# Patient Record
Sex: FEMALE
[De-identification: names, ages, dates, MRNs, and addresses within clinical notes are randomized per-mention and may not be internally consistent; named-entity substitution may affect disease eponyms.]

---

## 2018-07-04 ENCOUNTER — HOSPITAL ENCOUNTER (EMERGENCY)
Dept: HOSPITAL 97 - ER | Age: 28
Discharge: HOME | End: 2018-07-04
Payer: SELF-PAY

## 2018-07-04 DIAGNOSIS — I10: ICD-10-CM

## 2018-07-04 DIAGNOSIS — M25.511: Primary | ICD-10-CM

## 2018-07-04 PROCEDURE — 99283 EMERGENCY DEPT VISIT LOW MDM: CPT

## 2018-07-04 PROCEDURE — 81003 URINALYSIS AUTO W/O SCOPE: CPT

## 2018-07-04 NOTE — RAD REPORT
EXAM DESCRIPTION:  Shoulder  Right 2 View - 7/4/2018 1:56 am

 

CLINICAL HISTORY:  Fall, shoulder pain

 

COMPARISON:  None.

 

TECHNIQUE:  Internal and external rotation views of the right shoulder were obtained.

 

FINDINGS:  There is no fracture or dislocation. AC joint is normal in appearance. Acromial humeral marcy
int space is normal range with no abnormal calcifications. Ribs and parenchyma of the upper chest nor
mal range as well. No acute or suspicious findings.

 

IMPRESSION:  Negative two-view right shoulder examination.

## 2018-07-04 NOTE — ER
Nurse's Notes                                                                                     

 [facility]                                                                

Name: Sabine Lujan                                                                                

Age: 28 yrs                                                                                       

Sex: Female                                                                                       

: 1990                                                                                   

MRN: [id number]                                                                                   

Arrival Date: 2018                                                                          

Time: 00:50                                                                                       

Account#: [id number]                                                                            

Bed 24                                                                                            

Private MD:                                                                                       

Diagnosis: Pain in right shoulder-s/p Fall                                                        

                                                                                                  

Presentation:                                                                                     

                                                                                             

01:08 Presenting complaint: Patient states: hurt her right shoulder about an half hour ago.   ao  

      Transition of care: patient was not received from another setting of care. Onset of         

      symptoms was 2018 at 00:30. Risk Assessment: Do you want to hurt yourself or       

      someone else? Patient reports no desire to harm self or others. Initial Sepsis Screen:      

      Does the patient meet any 2 criteria? No. Patient's initial sepsis screen is negative.      

      Does the patient have a suspected source of infection? No. Patient's initial sepsis         

      screen is negative. Care prior to arrival: None.                                            

01:08 Method Of Arrival: Ambulatory                                                           ao  

01:08 Acuity: STEPHAN 4                                                                           ao  

                                                                                                  

Triage Assessment:                                                                                

01:16 Injury Description: Shoulder injury.                                                    ao  

                                                                                                  

OB/GYN:                                                                                           

01:11 LMP 2018                                                                           ao  

                                                                                                  

Historical:                                                                                       

- Allergies:                                                                                      

01:10 No Known Allergies;                                                                     ao  

- Home Meds:                                                                                      

01:10 None [Active];                                                                          ao  

- PMHx:                                                                                           

01:10 Hypertension;                                                                           ao  

- PSHx:                                                                                           

01:10 Tonsillectomy;                                                                          ao  

                                                                                                  

- Immunization history:: Adult Immunizations unknown.                                             

- Social history:: Smoking status: Patient uses tobacco products, denies chronic                  

  smoking, but will smoke occasionally, Patient uses alcohol, occasionally.                       

  Patient/guardian denies using street drugs.                                                     

- Ebola Screening: : Patient negative for fever greater than or equal to 101.5 degrees            

  Fahrenheit, and additional compatible Ebola Virus Disease symptoms Patient denies               

  exposure to infectious person Patient denies travel to an Ebola-affected area in the            

  21 days before illness onset.                                                                   

                                                                                                  

                                                                                                  

Screenin:15 Abuse screen: Denies threats or abuse. Denies injuries from another. Nutritional        ao  

      screening: No deficits noted. Tuberculosis screening: No symptoms or risk factors           

      identified. Fall Risk None identified.                                                      

                                                                                                  

Assessment:                                                                                       

01:12 General: Appears in no apparent distress. comfortable, Behavior is calm, cooperative,   ao  

      appropriate for age. Pain: Complains of pain in right shoulder. Neuro: Level of             

      Consciousness is awake, alert, obeys commands, Oriented to person, place, time,             

      situation, Appropriate for age Moves all extremities. Full function Speech is normal,       

      Cardiovascular: Capillary refill < 3 seconds Patient's skin is warm and dry.                

      Respiratory: Airway is patent Respiratory effort is even, unlabored, Respiratory            

      pattern is regular, symmetrical. GI: Abdomen is non-distended. : No signs and/or          

      symptoms were reported regarding the genitourinary system. EENT: No signs and/or            

      symptoms were reported regarding the EENT system. Derm: Skin is pink, warm \T\ dry.         

      normal, Skin temperature is warm. Musculoskeletal: Range of motion: limited in right        

      shoulder Reports pain in right shoulder since 0030. Pain is 8 out of 10 on a pain scale.    

                                                                                                  

Vital Signs:                                                                                      

01:11  / 87; Pulse 89; Resp 20; Temp 98.9(O); Pulse Ox 100% ; Weight 62.6 kg (R);       ao  

      Height 4 ft. 11 in. (149.86 cm) (R); Pain 8/10;                                             

01:11 Body Mass Index 27.87 (62.60 kg, 149.86 cm)                                             ao  

                                                                                                  

ED Course:                                                                                        

00:50 Patient arrived in ED.                                                                  ds1 

01:00 Michael Shelley PA is PHCP.                                                                cp  

01:00 Jarret Nixon MD is Attending Physician.                                            cp  

01:08 Mark Schneider, RN is Primary Nurse.                                                       ao  

01:10 Triage completed.                                                                       ao  

01:12 Arm band placed on right wrist. Patient placed in an exam room, Patient notified of     ao  

      wait time.                                                                                  

01:15 Patient has correct armband on for positive identification. Fall risk band placed. Bed  ao  

      in low position. Call light in reach. Pulse ox on. NIBP on.                                 

01:52 X-ray completed. Portable x-ray completed in exam room. Patient tolerated procedure     bb2 

      well.                                                                                       

01:54 XRAY Shoulder RIGHT 2 view In Process Unspecified.                                      EDMS

02:22 Faizan Guillermo MD is Referral Physician.                                               cp  

02:41 No provider procedures requiring assistance completed. Patient did not have IV access   ao  

      during this emergency room visit.                                                           

                                                                                                  

Administered Medications:                                                                         

No medications were administered                                                                  

                                                                                                  

                                                                                                  

Outcome:                                                                                          

02:22 Discharge ordered by MD.                                                                cp  

02:42 Discharged to home ambulatory.                                                          ao  

02:42 Condition: stable                                                                           

02:42 Discharge instructions given to patient, Instructed on discharge instructions, follow       

      up and referral plans. Demonstrated understanding of instructions, follow-up care,          

      medications, Prescriptions given X 3.                                                       

02:42 Patient left the ED.                                                                    ao  

                                                                                                  

Signatures:                                                                                       

Dispatcher [employer]                           EDMS                                                 

Stacey Browning                                ds1                                                  

Michael Shelley PA PA cp Ortiz, Alex, RN                         RN   Mey Bhagat                               bb2                                                  

                                                                                                  

**************************************************************************************************

## 2018-07-04 NOTE — EDPHYS
Physician Documentation                                                                           

 [facility]                                                                

Name: Sabine Lujan                                                                                

Age: 28 yrs                                                                                       

Sex: Female                                                                                       

: 1990                                                                                   

MRN: [id number]                                                                                   

Arrival Date: 2018                                                                          

Time: 00:50                                                                                       

Account#: [id number]                                                                            

Bed 24                                                                                            

Private MD:                                                                                       

ED Physician Jarret Nixon                                                                     

HPI:                                                                                              

                                                                                             

01:17 This 28 yrs old  Female presents to ER via Ambulatory with complaints of        cp  

      Shoulder Injury.                                                                            

01:17 The patient or guardian complains of an injury, pain, that is acute, tenderness. right  cp  

      shoulder. Context: The problem was sustained outdoors, resulted from fall while on back     

      of male friend. Onset: The symptoms/episode began/occurred just prior to arrival.           

                                                                                                  

OB/GYN:                                                                                           

01:11 LMP 2018                                                                           ao  

                                                                                                  

Historical:                                                                                       

- Allergies:                                                                                      

01:10 No Known Allergies;                                                                     ao  

- Home Meds:                                                                                      

01:10 None [Active];                                                                          ao  

- PMHx:                                                                                           

01:10 Hypertension;                                                                           ao  

- PSHx:                                                                                           

01:10 Tonsillectomy;                                                                          ao  

                                                                                                  

- Immunization history:: Adult Immunizations unknown.                                             

- Social history:: Smoking status: Patient uses tobacco products, denies chronic                  

  smoking, but will smoke occasionally, Patient uses alcohol, occasionally.                       

  Patient/guardian denies using street drugs.                                                     

- Ebola Screening: : Patient negative for fever greater than or equal to 101.5 degrees            

  Fahrenheit, and additional compatible Ebola Virus Disease symptoms Patient denies               

  exposure to infectious person Patient denies travel to an Ebola-affected area in the            

  21 days before illness onset.                                                                   

                                                                                                  

                                                                                                  

ROS:                                                                                              

01:20 Constitutional: Negative for body aches, chills, fever, poor PO intake.                 cp  

01:20 Eyes: Negative for injury, pain, redness, and discharge.                                cp  

01:20 ENT: Negative for drainage from ear(s), ear pain, sore throat, difficulty swallowing,       

      difficulty handling secretions.                                                             

01:20 Neck: Negative for pain with movement, pain at rest, stiffness, bony tenderness.            

01:20 Cardiovascular: Negative for chest pain, edema, palpitations.                               

01:20 Respiratory: Negative for cough, shortness of breath, wheezing.                             

01:20 Abdomen/GI: Negative for abdominal pain, vomiting, diarrhea, constipation.                  

01:20 Back: Negative for injury or acute deformity, pain at rest, pain with movement.             

01:20 MS/extremity: Positive for pain, swelling, tenderness, of the top of right shoulder,        

      painful ROM, Negative for decreased range of motion, paresthesias.                          

01:20 Skin: Negative for cellulitis, rash.                                                        

01:20 Neuro: Negative for altered mental status, headache, loss of consciousness, syncope,        

      near syncope.                                                                               

01:20 All other systems are negative.                                                             

                                                                                                  

Exam:                                                                                             

01:27 Constitutional: The patient appears in no acute distress, alert, awake, non-toxic, well cp  

      developed, well nourished, uncomfortable.                                                   

01:27 Head/Face:  Normocephalic, atraumatic.                                                  cp  

01:27 Eyes: Periorbital structures: appear normal, Pupils: equal, round, and reactive to          

      light and accomodation, Extraocular movements: intact throughout, Conjunctiva: normal,      

      no exudate, no injection, Lids and lashes: appear normal, bilaterally.                      

01:27 ENT: External ear(s): are unremarkable, Nose: is normal, Mouth: Lips: moist, Oral           

      mucosa: pink and intact, moist, Posterior pharynx: is normal, airway is patent.             

01:27 Neck: C-spine: vertebral tenderness, is not appreciated, crepitus, is not appreciated,      

      ROM/movement: is normal, is supple, without pain, no range of motions limitations, no       

      nuchal rigidity.                                                                            

01:27 Chest/axilla: Inspection: normal, Palpation: tenderness, that is moderate, of the           

      right distal clavicle, that partially reproduces the patient's complaints.                  

01:27 Cardiovascular: Rate: normal, Rhythm: regular, Pulses: Pulses are 2+ in right radial        

      artery. JVD: is not appreciated.                                                            

01:27 Respiratory: the patient does not display signs of respiratory distress,  Respirations:     

      normal, no use of accessory muscles, no retractions, no splinting, no tachypnea,            

      labored breathing, is not present, Breath sounds: are clear throughout, no decreased        

      breath sounds, no stridor, no wheezing.                                                     

01:27 Abdomen/GI: Exam negative for discomfort, distension, guarding, Inspection: abdomen         

      appears normal.                                                                             

01:27 Back: pain, is absent, ROM is normal.                                                       

01:27 Musculoskeletal/extremity: Extremities: grossly normal except: noted in the right AC        

      joint: pain, swelling, tenderness, There is no evidence of  decreased ROM, deformity,       

      right shoulder, Sensation intact.                                                           

01:27 Skin: cellulitis, is not appreciated, no rash present.                                      

01:27 Neuro: Orientation: to person, place \T\ time. Mentation: lucid, able to follow commands.   

                                                                                                  

Vital Signs:                                                                                      

01:11  / 87; Pulse 89; Resp 20; Temp 98.9(O); Pulse Ox 100% ; Weight 62.6 kg (R);       ao  

      Height 4 ft. 11 in. (149.86 cm) (R); Pain 8/10;                                             

01:11 Body Mass Index 27.87 (62.60 kg, 149.86 cm)                                             ao  

                                                                                                  

MDM:                                                                                              

01:00 Patient medically screened.                                                             cp  

02:20 Data reviewed: vital signs, nurses notes, radiologic studies, plain films, and as a     cp  

      result, I will discharge patient.                                                           

02:20 Differential diagnosis: Anterior dislocation with fracture, Anterior dislocation        cp  

      without fracture, Posterior dislocation with fracture, Posterior dislocation without        

      fracture, AC joint separation, clavicle fracture. Test interpretation: by ED physician      

      or midlevel provider: plain radiologic studies.                                             

                                                                                                  

                                                                                             

02:24 Order name: Urine Dipstick--Ancillary (enter results)                                   rg2 

                                                                                             

01:15 Order name: XRAY Shoulder RIGHT 2 view                                                  cp  

                                                                                             

01:15 Order name: Urine Pregnancy Test (obtain specimen); Complete Time: 02:20                cp  

                                                                                             

01:15 Order name: Urine Dipstick-Ancillary (obtain specimen); Complete Time: 02:20            cp  

                                                                                             

02:20 Order name: Sling; Complete Time: 02:24                                                 cp  

                                                                                                  

Administered Medications:                                                                         

No medications were administered                                                                  

                                                                                                  

                                                                                                  

Disposition:                                                                                      

04:14 Co-signature as Attending Physician, Jarret Nixon MD I agree with the assessment and 4 

      plan of care.                                                                               

                                                                                                  

Disposition:                                                                                      

18 02:22 Discharged to Home. Impression: Pain in right shoulder - s/p Fall.                 

- Condition is Stable.                                                                            

- Discharge Instructions: Acromioclavicular Injuries, Shoulder Pain.                              

- Prescriptions for Cyclobenzaprine 10 mg Oral Tablet - take 1 tablet by ORAL route               

  every 8 hours As needed no driving while taking medication; 20 tablet. Tramadol 50 mg           

  Oral Tablet - take 1 tablet by ORAL route every 8 hours as needed; 15 tablet.                   

  Ibuprofen 800 mg Oral Tablet - take 1 tablet by ORAL route every 8 hours As needed              

  take with food; 30 tablet.                                                                      

- Medication Reconciliation Form, Thank You Letter, Antibiotic Education, Prescription            

  Opioid Use form.                                                                                

- Follow up: Faizan Guillermo MD; When: 2 - 3 days; Reason: Recheck today's complaints.            

- Problem is new.                                                                                 

- Symptoms have improved.                                                                         

                                                                                                  

                                                                                                  

                                                                                                  

Signatures:                                                                                       

Dispatcher [employer]                           EDMS                                                 

Michael Shelley PA PA cp Ortiz, Alex RN                         RN   ao                                                   

Jarret Nixon MD MD   tw4                                                  

                                                                                                  

Corrections: (The following items were deleted from the chart)                                    

02:42 02:22 2018 02:22 Discharged to Home. Impression: Pain in right shoulder - s/p     ao  

      Fall. Condition is Stable. Forms are Medication Reconciliation Form, Thank You Letter,      

      Antibiotic Education, Prescription Opioid Use. Follow up: Faizan Guillermo; When: 2 - 3        

      days; Reason: Recheck today's complaints. Problem is new. Symptoms have improved. cp        

                                                                                                  

**************************************************************************************************

## 2018-10-31 ENCOUNTER — HOSPITAL ENCOUNTER (EMERGENCY)
Dept: HOSPITAL 97 - ER | Age: 28
Discharge: LEFT BEFORE DISCHARGE | End: 2018-10-31
Payer: SELF-PAY

## 2018-10-31 DIAGNOSIS — Z02.9: Primary | ICD-10-CM

## 2018-10-31 NOTE — ER
Nurse's Notes                                                                                     

 [facility]                                                                

Name: Sabine Lujan                                                                                

Age: 28 yrs                                                                                       

Sex: Female                                                                                       

: 1990                                                                                   

MRN: [id number]                                                                                   

Arrival Date: 10/31/2018                                                                          

Time: 21:40                                                                                       

Account#: [id number]                                                                            

Bed Waiting                                                                                       

Private MD:                                                                                       

Diagnosis:                                                                                        

                                                                                                  

Assessment:                                                                                       

10/31                                                                                             

22:31 Reassessment: Patient reported to registration that she will come back in the morning,  kr2 

      she is not pregnant but is just having irregular bleeding.                                  

                                                                                                  

ED Course:                                                                                        

21:40 Patient arrived in ED.                                                                  am2 

                                                                                                  

Administered Medications:                                                                         

No medications were administered                                                                  

                                                                                                  

                                                                                                  

Outcome:                                                                                          

22:33 Patient left the ED.                                                                    kr2 

                                                                                                  

Signatures:                                                                                       

Daniela Brooke                               am2                                                  

Nusrat Alanis RN                       RN   kr2                                                  

                                                                                                  

**************************************************************************************************

## 2018-11-01 ENCOUNTER — HOSPITAL ENCOUNTER (EMERGENCY)
Dept: HOSPITAL 97 - ER | Age: 28
Discharge: HOME | End: 2018-11-01
Payer: COMMERCIAL

## 2018-11-01 DIAGNOSIS — N93.9: Primary | ICD-10-CM

## 2018-11-01 DIAGNOSIS — I10: ICD-10-CM

## 2018-11-01 PROCEDURE — 81025 URINE PREGNANCY TEST: CPT

## 2018-11-01 PROCEDURE — 81003 URINALYSIS AUTO W/O SCOPE: CPT

## 2018-11-01 PROCEDURE — 99281 EMR DPT VST MAYX REQ PHY/QHP: CPT

## 2018-11-01 NOTE — EDPHYS
Physician Documentation                                                                           

 [facility]                                                                

Name: Sabine Lujan                                                                                

Age: 28 yrs                                                                                       

Sex: Female                                                                                       

: 1990                                                                                   

MRN: [id number]                                                                                   

Arrival Date: 2018                                                                          

Time: 10:15                                                                                       

Account#: [id number]                                                                            

Bed 17                                                                                            

Private MD:                                                                                       

ED Physician Varun Garcia                                                                         

HPI:                                                                                              

                                                                                             

12:05 This 28 yrs old  Female presents to ER via Ambulatory with complaints of        kav 

      Vaginal Bleeding.                                                                           

12:05 The patient presents with vaginal bleeding that is spotting, reports vaginal spotting   kav 

      after intercourse for the past 1.5 months. . Onset: The symptoms/episode began/occurred     

      acutely, 1.5 month(s) ago. Modifying factors: The symptoms are alleviated by avoiding       

      intercourse. Associated signs and symptoms: The patient has no apparent associated          

      signs or symptoms. Severity of symptoms: At their worst the symptoms were mild, this        

      morning. The patient is sexually active, reportedly has a single partner, does not use      

      protection during intercourse. The patient has not experienced similar symptoms in the      

      past. patient reports vaginal spotting after intercourse for past 1.5 months. she           

      reports that she has an obgyn appt with dr. mcduffie on 2018..                         

                                                                                                  

OB/GYN:                                                                                           

12:46 LMP 10/1/2018                                                                           aj  

                                                                                                  

Historical:                                                                                       

- Allergies:                                                                                      

10:42 No Known Allergies;                                                                     sv  

- PMHx:                                                                                           

10:42 Hypertension;                                                                           sv  

- PSHx:                                                                                           

10:42 Tonsillectomy;                                                                          sv  

                                                                                                  

- Immunization history:: Flu vaccine is not up to date.                                           

- Social history:: Smoking status: Patient/guardian denies using tobacco.                         

- Ebola Screening: : No symptoms or risks identified at this time.                                

- Family history:: not pertinent.                                                                 

- Hospitalizations: : No recent hospitalization is reported.                                      

- History obtained from: significant other.                                                       

                                                                                                  

                                                                                                  

ROS:                                                                                              

12:10  Positive for intermittent post-coital intercourse, Negative for urinary symptoms,    kav 

      urinary frequency, pelvic pain, burning with urination, vaginal discharge, missed           

      period.                                                                                     

12:10 Constitutional: Negative for fever, chills, and weight loss, Eyes: Negative for injury,     

      pain, redness, and discharge, ENT: Negative for injury, pain, and discharge, Neck:          

      Negative for injury, pain, and swelling, Cardiovascular: Negative for chest pain,           

      palpitations, and edema, Respiratory: Negative for shortness of breath, cough,              

      wheezing, and pleuritic chest pain, Abdomen/GI: Negative for abdominal pain, nausea,        

      vomiting, diarrhea, and constipation, Back: Negative for injury and pain, MS/Extremity:     

      Negative for injury and deformity, Skin: Negative for injury, rash, and discoloration,      

      Neuro: Negative for headache, weakness, numbness, tingling, and seizure, Psych:             

      Negative for depression, anxiety, suicide ideation, homicidal ideation, and                 

      hallucinations, Allergy/Immunology: Negative for hives, rash, and allergies, Endocrine:     

      Negative for neck swelling, polydipsia, polyuria, polyphagia, and marked weight             

      changes, Hematologic/Lymphatic: Negative for swollen nodes, abnormal bleeding, and          

      unusual bruising.                                                                           

                                                                                                  

Exam:                                                                                             

12:10 Constitutional:  This is a well developed, well nourished patient who is awake, alert,  kav 

      and in no acute distress. Head/Face:  Normocephalic, atraumatic. Eyes:  Pupils equal        

      round and reactive to light, extra-ocular motions intact.  Lids and lashes normal.          

      Conjunctiva and sclera are non-icteric and not injected.  Cornea within normal limits.      

      Periorbital areas with no swelling, redness, or edema. ENT:  Nares patent. No nasal         

      discharge, no septal abnormalities noted.  Tympanic membranes are normal and external       

      auditory canals are clear.  Oropharynx with no redness, swelling, or masses, exudates,      

      or evidence of obstruction, uvula midline.  Mucous membranes moist. Neck:  Trachea          

      midline, no thyromegaly or masses palpated, and no cervical lymphadenopathy.  Supple,       

      full range of motion without nuchal rigidity, or vertebral point tenderness.  No            

      Meningismus. Chest/axilla:  Normal chest wall appearance and motion.  Nontender with no     

      deformity.  No lesions are appreciated. Cardiovascular:  Regular rate and rhythm with a     

      normal S1 and S2.  No gallops, murmurs, or rubs.  Normal PMI, no JVD.  No pulse             

      deficits. Respiratory:  Lungs have equal breath sounds bilaterally, clear to                

      auscultation and percussion.  No rales, rhonchi or wheezes noted.  No increased work of     

      breathing, no retractions or nasal flaring. Abdomen/GI:  Soft, non-tender, with normal      

      bowel sounds.  No distension or tympany.  No guarding or rebound.  No evidence of           

      tenderness throughout. Back:  No spinal tenderness.  No costovertebral tenderness.          

      Full range of motion. Skin:  Warm, dry with normal turgor.  Normal color with no            

      rashes, no lesions, and no evidence of cellulitis. MS/ Extremity:  Pulses equal, no         

      cyanosis.  Neurovascular intact.  Full, normal range of motion. Neuro:  Awake and           

      alert, GCS 15, oriented to person, place, time, and situation.  Cranial nerves II-XII       

      grossly intact.  Motor strength 5/5 in all extremities.  Sensory grossly intact.            

      Cerebellar exam normal.  Normal gait. Psych:  Awake, alert, with orientation to person,     

      place and time.  Behavior, mood, and affect are within normal limits.                       

12:10 : CVA tenderness, is absent, Sexual behavior: the patient is sexually active, and         

      reports a single partner.                                                                   

                                                                                                  

Vital Signs:                                                                                      

10:43  / 90; Pulse 81; Resp 18; Temp 97.1; Pulse Ox 99% ; Weight 71.21 kg; Height 4 ft. sv  

      11 in. (149.86 cm); Pain 3/10;                                                              

12:43  / 86; Pulse 78; Resp 18; Pulse Ox 100% on R/A;                                   aj  

10:43 Body Mass Index 31.71 (71.21 kg, 149.86 cm)                                             sv  

                                                                                                  

MDM:                                                                                              

11:36 Medical screening is not applicable.                                                    ka 

12:33 Data reviewed: vital signs, nurses notes, lab test result(s), urinalysis, UPT:.         [facility] 

                                                                                                  

                                                                                             

12:28 Order name: Urine Dipstick--Ancillary (enter results)                                     

                                                                                             

12:28 Order name: Urine Pregnancy--Ancillary (enter results)                                    

                                                                                             

11:57 Order name: Urine Dipstick-Ancillary (obtain specimen); Complete Time: 12:15            [facility] 

                                                                                             

11:57 Order name: Urine Pregnancy Test (obtain specimen); Complete Time: 12:15                ka 

                                                                                                  

Administered Medications:                                                                         

No medications were administered                                                                  

                                                                                                  

                                                                                                  

Disposition:                                                                                      

18:46 Co-signature as Attending Physician, Varun Garcia MD.                                    rn  

                                                                                                  

Disposition:                                                                                      

18 12:35 Discharged to Home. Impression: Abnormal uterine and vaginal bleeding,             

  unspecified.                                                                                    

- Condition is Stable.                                                                            

- Discharge Instructions: Abnormal Uterine Bleeding, Easy-to-Read.                                

                                                                                                  

- Medication Reconciliation Form, Thank You Letter form.                                          

- Follow up: Jeanine Dougherty MD; When: 2018; Reason: Recheck today's complaints,               

  Continuance of care, Re-evaluation by your physician.                                           

- Problem is new.                                                                                 

- Symptoms are unchanged.                                                                         

                                                                                                  

                                                                                                  

                                                                                                  

Signatures:                                                                                       

Dispatcher [employer]                           Isabella Chandra RN RN sv Myers, Amanda, RN RN aj Vern, Katherine, Varun Thompson MD MD   rn                                                   

                                                                                                  

Corrections: (The following items were deleted from the chart)                                    

13:02 12:35 2018 12:35 Discharged to Home. Impression: Abnormal uterine and vaginal     aj  

      bleeding, unspecified. Condition is Stable. Forms are Medication Reconciliation Form,       

      Thank You Letter, Antibiotic Education, Prescription Opioid Use. Follow up: Jeanine Farhat;     

      When: 2018; Reason: Recheck today's complaints, Continuance of care,                  

      Re-evaluation by your physician. Problem is new. Symptoms are unchanged. nathaniel                

                                                                                                  

**************************************************************************************************

## 2018-11-01 NOTE — ER
Nurse's Notes                                                                                     

 [facility]                                                                

Name: Sabine Lujan                                                                                

Age: 28 yrs                                                                                       

Sex: Female                                                                                       

: 1990                                                                                   

MRN: [id number]                                                                                   

Arrival Date: 2018                                                                          

Time: 10:15                                                                                       

Account#: [id number]                                                                            

Bed 17                                                                                            

Private MD:                                                                                       

Diagnosis: Abnormal uterine and vaginal bleeding, unspecified                                     

                                                                                                  

Presentation:                                                                                     

                                                                                             

10:38 Presenting complaint: Patient states: light red spotty vaginal bleeding since Monday    sv  

      but this has been going on for about 1.5 months. Bleeding happens after vaginal             

      intercourse. Pt had a blood pregnancy test 3.5 weeks ago and it was negative. Pt            

      reports feeling jittery, dizziness since . Transition of care: patient was not        

      received from another setting of care. Onset of symptoms was 2018. Care         

      prior to arrival: None.                                                                     

10:38 Method Of Arrival: Ambulatory                                                           sv  

10:38 Acuity: STEPHAN 3                                                                           sv  

12:45 Risk Assessment: Do you want to hurt yourself or someone else? Patient reports no       aj  

      desire to harm self or others.                                                              

12:45 Initial Sepsis Screen: Does the patient meet any 2 criteria? No. Patient's initial      aj  

      sepsis screen is negative. Does the patient have a suspected source of infection? No.       

      Patient's initial sepsis screen is negative.                                                

                                                                                                  

OB/GYN:                                                                                           

12:46 LMP 10/1/2018                                                                           aj  

                                                                                                  

Historical:                                                                                       

- Allergies:                                                                                      

10:42 No Known Allergies;                                                                     sv  

- PMHx:                                                                                           

10:42 Hypertension;                                                                           sv  

- PSHx:                                                                                           

10:42 Tonsillectomy;                                                                          sv  

                                                                                                  

- Immunization history:: Flu vaccine is not up to date.                                           

- Social history:: Smoking status: Patient/guardian denies using tobacco.                         

- Ebola Screening: : No symptoms or risks identified at this time.                                

- Family history:: not pertinent.                                                                 

- Hospitalizations: : No recent hospitalization is reported.                                      

- History obtained from: significant other.                                                       

                                                                                                  

                                                                                                  

Screenin:15 Abuse screen: Denies threats or abuse. Denies injuries from another. Nutritional        aj  

      screening: No deficits noted. Tuberculosis screening: No symptoms or risk factors           

      identified. Fall Risk None identified.                                                      

                                                                                                  

Assessment:                                                                                       

12:15 General: Appears in no apparent distress. comfortable, Behavior is calm, cooperative,   aj  

      appropriate for age. Pain: Denies pain. Neuro: Level of Consciousness is awake, alert,      

      obeys commands, Oriented to person, place, time, situation, Appropriate for age.            

      Respiratory: Airway is patent Respiratory effort is even, unlabored, Respiratory            

      pattern is regular, symmetrical. : Reports vaginal bleeding that is light flow. Derm:     

      Skin is intact, is healthy with good turgor, Skin is pink, warm \T\ dry. normal.            

12:43 Reassessment: Patient appears in no apparent distress at this time. No changes from     aj  

      previously documented assessment. Patient and/or family updated on plan of care and         

      expected duration. Pain level reassessed. Patient is alert, oriented x 3, equal             

      unlabored respirations, skin warm/dry/pink. Patient denies pain at this time.               

                                                                                                  

Vital Signs:                                                                                      

10:43  / 90; Pulse 81; Resp 18; Temp 97.1; Pulse Ox 99% ; Weight 71.21 kg; Height 4 ft. sv  

      11 in. (149.86 cm); Pain 3/10;                                                              

12:43  / 86; Pulse 78; Resp 18; Pulse Ox 100% on R/A;                                   aj  

10:43 Body Mass Index 31.71 (71.21 kg, 149.86 cm)                                             sv  

                                                                                                  

ED Course:                                                                                        

10:15 Patient arrived in ED.                                                                  mr  

10:42 Triage completed.                                                                       sv  

10:44 Arm band placed on.                                                                     sv  

11:36 Madison Wadsworth FNP is PHCP.                                                           kav 

11:36 Varun Garcia MD is Attending Physician.                                                kav 

11:42 Daniela Starr, RN is Primary Nurse.                                                     aj  

12:15 Patient has correct armband on for positive identification.                             aj  

12:15 No provider procedures requiring assistance completed.                                  aj  

12:34 Jeanine Dougherty MD is Referral Physician.                                                  kav 

12:43 Patient did not have IV access during this emergency room visit.                        aj  

                                                                                                  

Administered Medications:                                                                         

No medications were administered                                                                  

                                                                                                  

                                                                                                  

Outcome:                                                                                          

12:35 Discharge ordered by MD.                                                                kav 

12:43 Discharged to home ambulatory.                                                          aj  

12:43 Condition: good                                                                             

12:43 Discharge instructions given to patient, Instructed on discharge instructions, follow       

      up and referral plans. Demonstrated understanding of instructions, follow-up care.          

13:02 Patient left the ED.                                                                    aj  

                                                                                                  

Signatures:                                                                                       

Isabella Irwin RN RN sv Myers, Amanda, RN RN aj Vern, Katherine, FNP                    FNFRANKIE armstrong                                                  

Suzan Wei                                 mr                                                   

                                                                                                  

**************************************************************************************************